# Patient Record
Sex: MALE | Race: OTHER | HISPANIC OR LATINO | URBAN - METROPOLITAN AREA
[De-identification: names, ages, dates, MRNs, and addresses within clinical notes are randomized per-mention and may not be internally consistent; named-entity substitution may affect disease eponyms.]

---

## 2021-06-28 ENCOUNTER — INPATIENT (INPATIENT)
Facility: HOSPITAL | Age: 46
LOS: 2 days | Discharge: HOME | End: 2021-07-01
Attending: STUDENT IN AN ORGANIZED HEALTH CARE EDUCATION/TRAINING PROGRAM | Admitting: STUDENT IN AN ORGANIZED HEALTH CARE EDUCATION/TRAINING PROGRAM
Payer: COMMERCIAL

## 2021-06-28 VITALS
HEART RATE: 104 BPM | RESPIRATION RATE: 18 BRPM | SYSTOLIC BLOOD PRESSURE: 154 MMHG | DIASTOLIC BLOOD PRESSURE: 90 MMHG | OXYGEN SATURATION: 98 % | TEMPERATURE: 99 F

## 2021-06-28 LAB
ALBUMIN SERPL ELPH-MCNC: 4.6 G/DL — SIGNIFICANT CHANGE UP (ref 3.3–5)
ALP SERPL-CCNC: 99 U/L — SIGNIFICANT CHANGE UP (ref 40–120)
ALT FLD-CCNC: 22 U/L — SIGNIFICANT CHANGE UP (ref 4–41)
ANION GAP SERPL CALC-SCNC: 14 MMOL/L — SIGNIFICANT CHANGE UP (ref 7–14)
APTT BLD: 28.9 SEC — SIGNIFICANT CHANGE UP (ref 27–36.3)
AST SERPL-CCNC: 19 U/L — SIGNIFICANT CHANGE UP (ref 4–40)
BASOPHILS # BLD AUTO: 0.06 K/UL — SIGNIFICANT CHANGE UP (ref 0–0.2)
BASOPHILS NFR BLD AUTO: 0.5 % — SIGNIFICANT CHANGE UP (ref 0–2)
BILIRUB SERPL-MCNC: 0.6 MG/DL — SIGNIFICANT CHANGE UP (ref 0.2–1.2)
BUN SERPL-MCNC: 12 MG/DL — SIGNIFICANT CHANGE UP (ref 7–23)
CALCIUM SERPL-MCNC: 9.8 MG/DL — SIGNIFICANT CHANGE UP (ref 8.4–10.5)
CHLORIDE SERPL-SCNC: 101 MMOL/L — SIGNIFICANT CHANGE UP (ref 98–107)
CO2 SERPL-SCNC: 24 MMOL/L — SIGNIFICANT CHANGE UP (ref 22–31)
CREAT SERPL-MCNC: 0.87 MG/DL — SIGNIFICANT CHANGE UP (ref 0.5–1.3)
EOSINOPHIL # BLD AUTO: 0.15 K/UL — SIGNIFICANT CHANGE UP (ref 0–0.5)
EOSINOPHIL NFR BLD AUTO: 1.3 % — SIGNIFICANT CHANGE UP (ref 0–6)
GLUCOSE SERPL-MCNC: 102 MG/DL — HIGH (ref 70–99)
HCT VFR BLD CALC: 44.2 % — SIGNIFICANT CHANGE UP (ref 39–50)
HGB BLD-MCNC: 14 G/DL — SIGNIFICANT CHANGE UP (ref 13–17)
IANC: 8.28 K/UL — SIGNIFICANT CHANGE UP (ref 1.5–8.5)
IMM GRANULOCYTES NFR BLD AUTO: 0.3 % — SIGNIFICANT CHANGE UP (ref 0–1.5)
LYMPHOCYTES # BLD AUTO: 19.7 % — SIGNIFICANT CHANGE UP (ref 13–44)
LYMPHOCYTES # BLD AUTO: 2.31 K/UL — SIGNIFICANT CHANGE UP (ref 1–3.3)
MCHC RBC-ENTMCNC: 26.7 PG — LOW (ref 27–34)
MCHC RBC-ENTMCNC: 31.7 GM/DL — LOW (ref 32–36)
MCV RBC AUTO: 84.4 FL — SIGNIFICANT CHANGE UP (ref 80–100)
MONOCYTES # BLD AUTO: 0.9 K/UL — SIGNIFICANT CHANGE UP (ref 0–0.9)
MONOCYTES NFR BLD AUTO: 7.7 % — SIGNIFICANT CHANGE UP (ref 2–14)
NEUTROPHILS # BLD AUTO: 8.28 K/UL — HIGH (ref 1.8–7.4)
NEUTROPHILS NFR BLD AUTO: 70.5 % — SIGNIFICANT CHANGE UP (ref 43–77)
NRBC # BLD: 0 /100 WBCS — SIGNIFICANT CHANGE UP
NRBC # FLD: 0 K/UL — SIGNIFICANT CHANGE UP
PLATELET # BLD AUTO: 295 K/UL — SIGNIFICANT CHANGE UP (ref 150–400)
POTASSIUM SERPL-MCNC: 4.7 MMOL/L — SIGNIFICANT CHANGE UP (ref 3.5–5.3)
POTASSIUM SERPL-SCNC: 4.7 MMOL/L — SIGNIFICANT CHANGE UP (ref 3.5–5.3)
PROT SERPL-MCNC: 7.7 G/DL — SIGNIFICANT CHANGE UP (ref 6–8.3)
RBC # BLD: 5.24 M/UL — SIGNIFICANT CHANGE UP (ref 4.2–5.8)
RBC # FLD: 13.6 % — SIGNIFICANT CHANGE UP (ref 10.3–14.5)
SODIUM SERPL-SCNC: 139 MMOL/L — SIGNIFICANT CHANGE UP (ref 135–145)
WBC # BLD: 11.73 K/UL — HIGH (ref 3.8–10.5)
WBC # FLD AUTO: 11.73 K/UL — HIGH (ref 3.8–10.5)

## 2021-06-28 PROCEDURE — 73140 X-RAY EXAM OF FINGER(S): CPT | Mod: 26,LT,59

## 2021-06-28 PROCEDURE — 73120 X-RAY EXAM OF HAND: CPT | Mod: 26,LT

## 2021-06-28 PROCEDURE — 99218: CPT | Mod: 25

## 2021-06-28 PROCEDURE — 73130 X-RAY EXAM OF HAND: CPT | Mod: 26,LT

## 2021-06-28 RX ORDER — AMLODIPINE BESYLATE 2.5 MG/1
5 TABLET ORAL DAILY
Refills: 0 | Status: DISCONTINUED | OUTPATIENT
Start: 2021-06-28 | End: 2021-06-30

## 2021-06-28 RX ORDER — SODIUM CHLORIDE 9 MG/ML
1500 INJECTION INTRAMUSCULAR; INTRAVENOUS; SUBCUTANEOUS ONCE
Refills: 0 | Status: COMPLETED | OUTPATIENT
Start: 2021-06-28 | End: 2021-06-28

## 2021-06-28 RX ADMIN — SODIUM CHLORIDE 1500 MILLILITER(S): 9 INJECTION INTRAMUSCULAR; INTRAVENOUS; SUBCUTANEOUS at 23:15

## 2021-06-28 RX ADMIN — Medication 100 MILLIGRAM(S): at 21:57

## 2021-06-28 RX ADMIN — SODIUM CHLORIDE 1500 MILLILITER(S): 9 INJECTION INTRAMUSCULAR; INTRAVENOUS; SUBCUTANEOUS at 21:57

## 2021-06-28 RX ADMIN — Medication 600 MILLIGRAM(S): at 23:15

## 2021-06-28 NOTE — ED ADULT TRIAGE NOTE - CHIEF COMPLAINT QUOTE
pt arrives sent from Urgent Care to r/o cellulitis. Since Saturday pt has had swelling, redness to L hand, worsening today. No fevers/chills, CP, SOB. PMHx HTN

## 2021-06-28 NOTE — ED CDU PROVIDER INITIAL DAY NOTE - ATTENDING CONTRIBUTION TO CARE
44 y/o M w/ PMH HTN presents to the ED w/ swelling and pain to the back of his L hand. Pt is R handed at baseline. Pt states he has a small pimple on his L 3rd finger yesterday and squeezed it to try and pop it. Pt further states he went to work today wearing gloves and he felt pain to his hand, when he removed his glove he noticed swelling which prompted him to go to urgent care who outlined his swelling w/ a skin maker and referred him to the ED. Denies fever and has full ROM. Placed in CDU for IV abx and reassessment in the am.

## 2021-06-28 NOTE — ED PROVIDER NOTE - NS_ ATTENDINGSCRIBEDETAILS _ED_A_ED_FT
Elma Knapp MD: The history, relevant review of systems, past medical and surgical history, medical decision making, and physical examination was documented by the scribe in my presence and I attest to the accuracy of the documentation.

## 2021-06-28 NOTE — ED PROVIDER NOTE - OBJECTIVE STATEMENT
46 y/o M w/ PMH HTN presents to the ED w/ swelling and pain to the back of his L hand. Pt is R handed at baseline. Pt states he has a small pimple on his L 3rd finger yesterday and squeezed it to try and pop it. Pt further states he went to work today wearing gloves and he felt pain to his hand, when he removed his glove he noticed swelling which prompted him to go to urgent care who outlined his swelling w/ a skin maker and referred him to the ED. Denies fever and has full ROM.

## 2021-06-28 NOTE — ED CDU PROVIDER INITIAL DAY NOTE - OBJECTIVE STATEMENT
46 y/o M w/ PMH HTN presents to the ED w/ swelling and pain to the back of his L hand. Pt is R handed at baseline. Pt states he has a small pimple on his L 3rd finger yesterday and squeezed it to try and pop it. Pt further states he went to work today wearing gloves and he felt pain to his hand, when he removed his glove he noticed swelling which prompted him to go to urgent care who outlined his swelling w/ a skin maker and referred him to the ED. Denies fever and has full ROM.  While in ED received 1 dose Clindamycin. Hand XR shows 3rd digit cellulitis. Labs unremarkable. Cellulitis demarcated. Plan to observe in CDU overnight with continued IV abx. Reassess in AM.

## 2021-06-28 NOTE — ED PROVIDER NOTE - UPPER EXTREMITY EXAM, RIGHT
Swelling to dorsum hand extends to 3rd finger, raised pustular area, no fluctuance, no crepitus, 2+ distal pulses to radial wrist, good cap refill, full ROM.

## 2021-06-28 NOTE — ED PROVIDER NOTE - CLINICAL SUMMARY MEDICAL DECISION MAKING FREE TEXT BOX
46 y/o M p/w L hand cellulitis. Will obtain X-ray to rule out gas/air, give IV, antibiotics and observe in CDU.

## 2021-06-28 NOTE — ED ADULT NURSE NOTE - OBJECTIVE STATEMENT
Pt received to intake room 5. Pt A&Ox4, ambulatory. Pt states that he has a bump on his left hand yesterday and today he began to noticed increasing swelling and redness around the area. Pt c/o of slight pain to the area as well. Respirations equal and unlabored, no acute distress noted. 20g IV placed in right AC, labs drawn and sent as ordered. Vital signs as noted, comfort measures provided, call bell within reach. Assessment ongoing.

## 2021-06-29 DIAGNOSIS — Z29.9 ENCOUNTER FOR PROPHYLACTIC MEASURES, UNSPECIFIED: ICD-10-CM

## 2021-06-29 DIAGNOSIS — L02.91 CUTANEOUS ABSCESS, UNSPECIFIED: ICD-10-CM

## 2021-06-29 DIAGNOSIS — M54.9 DORSALGIA, UNSPECIFIED: ICD-10-CM

## 2021-06-29 DIAGNOSIS — L03.012 CELLULITIS OF LEFT FINGER: ICD-10-CM

## 2021-06-29 DIAGNOSIS — L03.90 CELLULITIS, UNSPECIFIED: ICD-10-CM

## 2021-06-29 DIAGNOSIS — I10 ESSENTIAL (PRIMARY) HYPERTENSION: ICD-10-CM

## 2021-06-29 LAB — SARS-COV-2 RNA SPEC QL NAA+PROBE: SIGNIFICANT CHANGE UP

## 2021-06-29 PROCEDURE — 99217: CPT

## 2021-06-29 PROCEDURE — 99223 1ST HOSP IP/OBS HIGH 75: CPT | Mod: GC

## 2021-06-29 RX ORDER — VANCOMYCIN HCL 1 G
1000 VIAL (EA) INTRAVENOUS
Refills: 0 | Status: DISCONTINUED | OUTPATIENT
Start: 2021-06-29 | End: 2021-06-29

## 2021-06-29 RX ORDER — VANCOMYCIN HCL 1 G
1500 VIAL (EA) INTRAVENOUS EVERY 12 HOURS
Refills: 0 | Status: DISCONTINUED | OUTPATIENT
Start: 2021-06-30 | End: 2021-07-01

## 2021-06-29 RX ORDER — IBUPROFEN 200 MG
600 TABLET ORAL ONCE
Refills: 0 | Status: COMPLETED | OUTPATIENT
Start: 2021-06-29 | End: 2021-06-29

## 2021-06-29 RX ORDER — VANCOMYCIN HCL 1 G
1000 VIAL (EA) INTRAVENOUS ONCE
Refills: 0 | Status: COMPLETED | OUTPATIENT
Start: 2021-06-29 | End: 2021-06-29

## 2021-06-29 RX ORDER — TETANUS TOXOID, REDUCED DIPHTHERIA TOXOID AND ACELLULAR PERTUSSIS VACCINE, ADSORBED 5; 2.5; 8; 8; 2.5 [IU]/.5ML; [IU]/.5ML; UG/.5ML; UG/.5ML; UG/.5ML
0.5 SUSPENSION INTRAMUSCULAR ONCE
Refills: 0 | Status: COMPLETED | OUTPATIENT
Start: 2021-06-29 | End: 2021-06-29

## 2021-06-29 RX ORDER — ACETAMINOPHEN 500 MG
650 TABLET ORAL EVERY 6 HOURS
Refills: 0 | Status: DISCONTINUED | OUTPATIENT
Start: 2021-06-29 | End: 2021-07-01

## 2021-06-29 RX ADMIN — AMLODIPINE BESYLATE 5 MILLIGRAM(S): 2.5 TABLET ORAL at 06:11

## 2021-06-29 RX ADMIN — Medication 250 MILLIGRAM(S): at 12:45

## 2021-06-29 RX ADMIN — Medication 600 MILLIGRAM(S): at 17:58

## 2021-06-29 RX ADMIN — Medication 300 MILLIGRAM(S): at 23:44

## 2021-06-29 RX ADMIN — Medication 100 MILLIGRAM(S): at 06:12

## 2021-06-29 RX ADMIN — Medication 650 MILLIGRAM(S): at 20:41

## 2021-06-29 RX ADMIN — Medication 650 MILLIGRAM(S): at 21:11

## 2021-06-29 NOTE — ED CDU PROVIDER SUBSEQUENT DAY NOTE - PHYSICAL EXAMINATION
Left hand: NV intact, erythema and swelling to posterior aspect of hand, warm to touch, redness extending past marked border, small pustule noted to posterior aspect of 3rd digit proximal phalanx, FROM, moving all fingers.

## 2021-06-29 NOTE — ED CDU PROVIDER DISPOSITION NOTE - ATTENDING CONTRIBUTION TO CARE
46 y/o M w/ PMH HTN presents to the ED w/ swelling and pain to the back of his L hand. Pt is R handed at baseline. Pt states he has a small pimple on his L 3rd finger yesterday and squeezed it to try and pop it. Pt further states he went to work wearing gloves and he felt pain to his hand, when he removed his glove he noticed swelling which prompted him to go to urgent care who outlined his swelling w/ a skin maker and referred him to the ED. Denies fever and has full ROM.  While in ED received 1 dose Clindamycin.  Pt ws observed overnight in CDU, but had worsening erythema and edema of hand.  Hand consult was called and I&D was performed and patient was recommended for admission for continued abx.  Pt resting comfortably after I&D and understands plan for admission.  - Rachel Wallace DO

## 2021-06-29 NOTE — H&P ADULT - NSHPLABSRESULTS_GEN_ALL_CORE
Personally reviewed labs, imaging, ekg                           14.0   11.73 )-----------( 295      ( 28 Jun 2021 22:25 )             44.2       06-28    139  |  101  |  12  ----------------------------<  102<H>  4.7   |  24  |  0.87    Ca    9.8      28 Jun 2021 22:25    TPro  7.7  /  Alb  4.6  /  TBili  0.6  /  DBili  x   /  AST  19  /  ALT  22  /  AlkPhos  99  06-28                  PTT - ( 28 Jun 2021 22:25 )  PTT:28.9 sec    Lactate Trend            CAPILLARY BLOOD GLUCOSE    < from: Xray Hand 3 Views, Left (06.28.21 @ 21:28) >    IMPRESSION:  1.  Left third digit cellulitis.  2.  No tracking subcutaneous emphysema.  3.  No radiographic evidence of advanced osteomyelitis.      < end of copied text >                    EKG:

## 2021-06-29 NOTE — H&P ADULT - NSICDXPASTMEDICALHX_GEN_ALL_CORE_FT
PAST MEDICAL HISTORY:  Back pain sciatica    Hypertension     No pertinent past medical history

## 2021-06-29 NOTE — ED CDU PROVIDER SUBSEQUENT DAY NOTE - PROGRESS NOTE DETAILS
Pt well appearing. Continue IV abx. Erythema contained within demarcated area. Pt seen and examined bedside - dorsal left 3rd digit with pustule, edema and increasing erythema despite clinda.  Pt reports pain in the area and worsening swelling.  No fevers or chills.  will consult hand as I&D seems necessary at this time given worsening appearance.  - Rachel Wallace,

## 2021-06-29 NOTE — ED CDU PROVIDER SUBSEQUENT DAY NOTE - ATTENDING CONTRIBUTION TO CARE
Pt seen and examined bedside - dorsal left 3rd digit with pustule, edema and increasing erythema despite clinda.  Pt reports pain in the area and worsening swelling.  No fevers or chills.  will consult hand as I&D seems necessary at this time given worsening appearance.  - Rachel Wallace DO.

## 2021-06-29 NOTE — H&P ADULT - NSICDXFAMILYHX_GEN_ALL_CORE_FT
FAMILY HISTORY:  Father  Still living? Unknown  FH: HTN (hypertension), Age at diagnosis: Age Unknown  FH: type 2 diabetes, Age at diagnosis: Age Unknown

## 2021-06-29 NOTE — H&P ADULT - HISTORY OF PRESENT ILLNESS
44 y/o M w/ PMH of HTN and chronic back pain presenting with small pimple-like abscess on 3rd finger of L hand and swelling of dorsum portion of hand. Pt reports the abscess first formed on Saturday and progressively got more painful and the swelling spread until reaching approximately 3/4 of the dorsum of his hand today. He reports the pain as 10/10, constant, radiating from his finger to the rest of his hand. He did not try anything to alleviate the pain, movement makes the pain worse. Pt denies being bitten or scraping his hand, reports that the abscess formed out of nowhere. This has never happened before. Denies numbness, paresthesia, muscle weakness, fever, chills, SOB, CP, cough, nausea, vomiting, diarrhea, constipation.    PMH: HTN, chronic back pain of six months   46 y/o M w/ PMH of HTN and chronic back pain presenting with small pimple-like abscess on 3rd finger of L hand and swelling of dorsum portion of hand. Pt reports the abscess first formed on Saturday and progressively got more painful and the swelling spread until reaching approximately 3/4 of the dorsum of his hand today. He reports the pain as 10/10, constant, radiating from his finger to the rest of his hand. He did not try anything to alleviate the pain, movement makes the pain worse. Pt denies being bitten or scraping his hand, reports that the abscess formed out of nowhere. This has never happened before. Denies numbness, paresthesia, muscle weakness, fever, chills, SOB, CP, cough, nausea, vomiting, diarrhea, constipation.    PMH: HTN, chronic back pain of six months    ED course: Received Azithromycin 600 mg q8 , Vancomycin 1g, 1.5L NS, motrin  46 y/o M w/ PMH of HTN and chronic back pain presenting with small pimple-like abscess on 3rd finger of L hand and swelling of dorsum portion of hand. Pt reports the abscess first formed on Saturday and progressively got more painful and the swelling spread until reaching approximately 3/4 of the dorsum of his hand today. He reports the pain as 10/10, constant, radiating from his finger to the rest of his hand. He did not try anything to alleviate the pain, movement makes the pain worse. Pt denies being bitten or scraping his hand, reports that the abscess formed out of nowhere. This has never happened before. Denies numbness, paresthesia, muscle weakness, fever, chills, SOB, CP, cough, nausea, vomiting, diarrhea, constipation.    PMH: HTN, chronic back pain of six months    ED course: Received clindamycin 600 mg q8 , Vancomycin 1g, 1.5L NS, motrin

## 2021-06-29 NOTE — H&P ADULT - NSHPPHYSICALEXAM_GEN_ALL_CORE
VITALS:   T(C): 36.9 (06-29-21 @ 17:35), Max: 37.1 (06-29-21 @ 06:07)  HR: 82 (06-29-21 @ 17:35) (68 - 82)  BP: 146/75 (06-29-21 @ 17:35) (130/84 - 150/87)  RR: 18 (06-29-21 @ 17:35) (17 - 20)  SpO2: 99% (06-29-21 @ 17:35) (98% - 100%)    GENERAL: NAD, lying in bed comfortably  HEAD:  Atraumatic, Normocephalic  EYES: EOMI, PERRLA, conjunctiva and sclera clear  ENT: Moist mucous membranes  NECK: Supple, No JVD  CHEST/LUNG: Clear to auscultation bilaterally; No rales, rhonchi, wheezing, or rubs. Unlabored respirations  HEART: Regular rate and rhythm; No murmurs, rubs, or gallops  ABDOMEN: BSx4; Soft, nontender, nondistended  EXTREMITIES:   3rd finger on L hand s/p I&D, wrapped in bandage, L dorsum of hand significantly swollen and tender 3/4 down to the wrist.  NERVOUS SYSTEM:  A&Ox3, no focal deficits   SKIN: Erythema around site of abscess on 3rd L finger; No rashes or lesions  psych: normal behavior, normal affect

## 2021-06-29 NOTE — H&P ADULT - ASSESSMENT
44 y/o M with PMH of HTN and sciatica p/w abscess on 3rd finger of L hand and swelling on dorsum of L hand. WBC 11.73, X-ray of L hand showed cellulitis of 3rd finger, will start on vancomycin 1 gram and perform tdap shot.

## 2021-06-29 NOTE — H&P ADULT - NSHPREVIEWOFSYSTEMS_GEN_ALL_CORE
REVIEW OF SYSTEMS:    CONSTITUTIONAL: No weakness, fevers or chills  EYES: No visual changes; no sclera icterics, no pain or drainage  ENT:  No vertigo or throat pain   NECK: No pain or stiffness  RESPIRATORY: No cough, wheezing, hemoptysis; No shortness of breath  CARDIOVASCULAR: No chest pain or palpitations  GASTROINTESTINAL: No abdominal or epigastric pain. No nausea, vomiting, or hematemesis; No diarrhea or constipation. No melena or hematochezia.  GENITOURINARY: No dysuria, frequency or hematuria  NEUROLOGICAL: No numbness or weakness  SKIN: No itching, rashes  Psych: No anxiety or depression

## 2021-06-29 NOTE — H&P ADULT - ATTENDING COMMENTS
Patient is a 45 year old man with past medical history of HTN coming in for L hand cellulitis + abscess. S/p abscess drainage. Patient pending cultures for de-escalation (patient currently on vancomycin for empiric coverage of MRSA). Anticipate discharge 7/1.    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 Will give tetanus shot.   - anticipate d'c 7/1 plan communicated with patient

## 2021-06-29 NOTE — H&P ADULT - NSHPSOCIALHISTORY_GEN_ALL_CORE
Lives in a house in [place], has a dog at home, social drinker, denies smoking and drug use. Sexually active with wife, does not use condoms. Lives in a house in [place], has a dog at home, social drinker, denies smoking and drug use. Sexually active with wife, does not use condoms. Works in construction as a fermin.

## 2021-06-29 NOTE — ED CDU PROVIDER DISPOSITION NOTE - CLINICAL COURSE
46 y/o M w/ PMH HTN presents to the ED w/ swelling and pain to the back of his L hand. Pt is R handed at baseline. Pt states he has a small pimple on his L 3rd finger yesterday and squeezed it to try and pop it. Pt further states he went to work wearing gloves and he felt pain to his hand, when he removed his glove he noticed swelling which prompted him to go to urgent care who outlined his swelling w/ a skin maker and referred him to the ED. Denies fever and has full ROM.  While in ED received 1 dose Clindamycin.  Pt ws observed overnight in CDU, but had worsening erythema and edema of hand.  Hand consult was called and I&D was performed and patient was recommended for admission for continued abx.

## 2021-06-29 NOTE — ED CDU PROVIDER SUBSEQUENT DAY NOTE - HISTORY
AN Gutierres: pt seen and evaluated by Hand Surgery Dr Cheng I&D preformed at bedside advised to admit for further management.  Pt with left hand cellulitis extending passed marked border.  Pt aware and agreeable with plan.  MAR notified.

## 2021-06-30 LAB
BASOPHILS # BLD AUTO: 0.05 K/UL — SIGNIFICANT CHANGE UP (ref 0–0.2)
BASOPHILS NFR BLD AUTO: 0.6 % — SIGNIFICANT CHANGE UP (ref 0–2)
COVID-19 SPIKE DOMAIN AB INTERP: POSITIVE
COVID-19 SPIKE DOMAIN ANTIBODY RESULT: >250 U/ML — HIGH
EOSINOPHIL # BLD AUTO: 0.16 K/UL — SIGNIFICANT CHANGE UP (ref 0–0.5)
EOSINOPHIL NFR BLD AUTO: 1.9 % — SIGNIFICANT CHANGE UP (ref 0–6)
HCT VFR BLD CALC: 43.8 % — SIGNIFICANT CHANGE UP (ref 39–50)
HGB BLD-MCNC: 14.1 G/DL — SIGNIFICANT CHANGE UP (ref 13–17)
IANC: 6.36 K/UL — SIGNIFICANT CHANGE UP (ref 1.5–8.5)
IMM GRANULOCYTES NFR BLD AUTO: 0.2 % — SIGNIFICANT CHANGE UP (ref 0–1.5)
LYMPHOCYTES # BLD AUTO: 1.28 K/UL — SIGNIFICANT CHANGE UP (ref 1–3.3)
LYMPHOCYTES # BLD AUTO: 15.1 % — SIGNIFICANT CHANGE UP (ref 13–44)
MCHC RBC-ENTMCNC: 27 PG — SIGNIFICANT CHANGE UP (ref 27–34)
MCHC RBC-ENTMCNC: 32.2 GM/DL — SIGNIFICANT CHANGE UP (ref 32–36)
MCV RBC AUTO: 83.7 FL — SIGNIFICANT CHANGE UP (ref 80–100)
MONOCYTES # BLD AUTO: 0.62 K/UL — SIGNIFICANT CHANGE UP (ref 0–0.9)
MONOCYTES NFR BLD AUTO: 7.3 % — SIGNIFICANT CHANGE UP (ref 2–14)
NEUTROPHILS # BLD AUTO: 6.36 K/UL — SIGNIFICANT CHANGE UP (ref 1.8–7.4)
NEUTROPHILS NFR BLD AUTO: 74.9 % — SIGNIFICANT CHANGE UP (ref 43–77)
NRBC # BLD: 0 /100 WBCS — SIGNIFICANT CHANGE UP
NRBC # FLD: 0 K/UL — SIGNIFICANT CHANGE UP
PLATELET # BLD AUTO: 286 K/UL — SIGNIFICANT CHANGE UP (ref 150–400)
RBC # BLD: 5.23 M/UL — SIGNIFICANT CHANGE UP (ref 4.2–5.8)
RBC # FLD: 13.6 % — SIGNIFICANT CHANGE UP (ref 10.3–14.5)
SARS-COV-2 IGG+IGM SERPL QL IA: >250 U/ML — HIGH
SARS-COV-2 IGG+IGM SERPL QL IA: POSITIVE
WBC # BLD: 8.49 K/UL — SIGNIFICANT CHANGE UP (ref 3.8–10.5)
WBC # FLD AUTO: 8.49 K/UL — SIGNIFICANT CHANGE UP (ref 3.8–10.5)

## 2021-06-30 PROCEDURE — 99232 SBSQ HOSP IP/OBS MODERATE 35: CPT | Mod: GC

## 2021-06-30 RX ORDER — LACTOBACILLUS ACIDOPHILUS 100MM CELL
1 CAPSULE ORAL DAILY
Refills: 0 | Status: DISCONTINUED | OUTPATIENT
Start: 2021-06-30 | End: 2021-07-01

## 2021-06-30 RX ORDER — HYDROGEN PEROXIDE 0.3 KG/100L
1 LIQUID TOPICAL ONCE
Refills: 0 | Status: COMPLETED | OUTPATIENT
Start: 2021-06-30 | End: 2021-07-01

## 2021-06-30 RX ORDER — AMLODIPINE BESYLATE 2.5 MG/1
5 TABLET ORAL ONCE
Refills: 0 | Status: COMPLETED | OUTPATIENT
Start: 2021-06-30 | End: 2021-06-30

## 2021-06-30 RX ORDER — AMLODIPINE BESYLATE 2.5 MG/1
10 TABLET ORAL DAILY
Refills: 0 | Status: DISCONTINUED | OUTPATIENT
Start: 2021-07-01 | End: 2021-07-01

## 2021-06-30 RX ORDER — HYDROGEN PEROXIDE 0.3 KG/100L
1 LIQUID TOPICAL ONCE
Refills: 0 | Status: DISCONTINUED | OUTPATIENT
Start: 2021-06-30 | End: 2021-06-30

## 2021-06-30 RX ADMIN — Medication 300 MILLIGRAM(S): at 23:24

## 2021-06-30 RX ADMIN — AMLODIPINE BESYLATE 5 MILLIGRAM(S): 2.5 TABLET ORAL at 06:35

## 2021-06-30 RX ADMIN — AMLODIPINE BESYLATE 5 MILLIGRAM(S): 2.5 TABLET ORAL at 12:59

## 2021-06-30 RX ADMIN — Medication 650 MILLIGRAM(S): at 23:00

## 2021-06-30 RX ADMIN — Medication 650 MILLIGRAM(S): at 22:27

## 2021-06-30 RX ADMIN — Medication 300 MILLIGRAM(S): at 12:55

## 2021-06-30 NOTE — PROGRESS NOTE ADULT - ATTENDING COMMENTS
Patient is a 45 year old man with past medical history of HTN coming in for L hand cellulitis + abscess. S/p abscess drainage. Patient pending cultures for de-escalation (patient currently on vancomycin for empiric coverage of MRSA). Anticipate discharge 7/1-7/2 pending plastics clearance and culture data.  - continue with vancomycin for left hand cellulitis and abscess  - monitor for improvement of swelling and erythema  - uptitrated amlodipine for better BP control   - f/u cultures for de-escalation   - anticipate d'c 7/1-7/2 plan communicated with patient .

## 2021-06-30 NOTE — PROGRESS NOTE ADULT - SUBJECTIVE AND OBJECTIVE BOX
HALLEY FARRIS  45y  Male      Patient is a 45y old  Male who presents with a chief complaint of cellulitis of 3rd finger of L hand (29 Jun 2021 18:02)      INTERVAL HPI/OVERNIGHT EVENTS:      REVIEW OF SYSTEMS:  CONSTITUTIONAL: No fever, weight loss, or fatigue  EYES: No eye pain, visual disturbances, or discharge  ENMT:  No difficulty hearing, tinnitus, vertigo; No sinus or throat pain  NECK: No pain or stiffness  BREASTS: No pain, masses, or nipple discharge  RESPIRATORY: No cough, wheezing, chills or hemoptysis; No shortness of breath  CARDIOVASCULAR: No chest pain, palpitations, dizziness, or leg swelling  GASTROINTESTINAL: No abdominal or epigastric pain. No nausea, vomiting, or hematemesis; No diarrhea or constipation. No melena or hematochezia.  GENITOURINARY: No dysuria, frequency, hematuria, or incontinence  NEUROLOGICAL: No headaches, memory loss, loss of strength, numbness, or tremors  SKIN: No itching, burning, rashes, or lesions   LYMPH NODES: No enlarged glands  ENDOCRINE: No heat or cold intolerance; No hair loss  MUSCULOSKELETAL: No joint pain or swelling; No muscle, back, or extremity pain  PSYCHIATRIC: No depression, anxiety, mood swings, or difficulty sleeping  HEME/LYMPH: No easy bruising, or bleeding gums  ALLERY AND IMMUNOLOGIC: No hives or eczema  FAMILY HISTORY:  FH: type 2 diabetes (Father)  htn    FH: HTN (hypertension) (Father)      T(C): 36.9 (06-30-21 @ 06:30), Max: 36.9 (06-29-21 @ 09:40)  HR: 69 (06-30-21 @ 06:30) (68 - 82)  BP: 150/92 (06-30-21 @ 06:30) (143/75 - 151/75)  RR: 18 (06-30-21 @ 06:30) (17 - 18)  SpO2: 98% (06-30-21 @ 06:30) (98% - 99%)  Wt(kg): --Vital Signs Last 24 Hrs  T(C): 36.9 (30 Jun 2021 06:30), Max: 36.9 (29 Jun 2021 09:40)  T(F): 98.5 (30 Jun 2021 06:30), Max: 98.5 (29 Jun 2021 09:40)  HR: 69 (30 Jun 2021 06:30) (68 - 82)  BP: 150/92 (30 Jun 2021 06:30) (143/75 - 151/75)  BP(mean): --  RR: 18 (30 Jun 2021 06:30) (17 - 18)  SpO2: 98% (30 Jun 2021 06:30) (98% - 99%)  No Known Allergies      PHYSICAL EXAM:  GENERAL: NAD, well-groomed, well-developed  HEAD:  Atraumatic, Normocephalic  EYES: EOMI, PERRLA, conjunctiva and sclera clear  ENMT: No tonsillar erythema, exudates, or enlargement; Moist mucous membranes, Good dentition, No lesions  NECK: Supple, No JVD, Normal thyroid  NERVOUS SYSTEM:  Alert & Oriented X3, Good concentration; Motor Strength 5/5 B/L upper and lower extremities; DTRs 2+ intact and symmetric  CHEST/LUNG: Clear to percussion bilaterally; No rales, rhonchi, wheezing, or rubs  HEART: Regular rate and rhythm; No murmurs, rubs, or gallops  ABDOMEN: Soft, Nontender, Nondistended; Bowel sounds present  EXTREMITIES:  2+ Peripheral Pulses, No clubbing, cyanosis, or edema  LYMPH: No lymphadenopathy noted  SKIN: No rashes or lesions    Consultant(s) Notes Reviewed:  [x ] YES  [ ] NO  Care Discussed with Consultants/Other Providers [ x] YES  [ ] NO    LABS:      RADIOLOGY & ADDITIONAL TESTS:    Imaging Personally Reviewed:  [ ] YES  [ ] NO  acetaminophen   Tablet .. 650 milliGRAM(s) Oral every 6 hours PRN  amLODIPine   Tablet 5 milliGRAM(s) Oral daily  vancomycin  IVPB 1500 milliGRAM(s) IV Intermittent every 12 hours      HEALTH ISSUES - PROBLEM Dx:  Abscess  Abscess    Cellulitis of finger of left hand  Cellulitis of finger of left hand    Back pain  Back pain    Hypertension  Hypertension    Prophylactic measure  Prophylactic measure             HALLEY FARRIS  45y  Male      Patient is a 45y old  Male who presents with a chief complaint of cellulitis of 3rd finger of L hand (29 Jun 2021 18:02)      INTERVAL HPI/OVERNIGHT EVENTS: No acute events overnight. Pt seen at bedside this morning, reported improvement in dorsum of hand in terms of pain, erythema and swelling but new erythema and pain under 3rd finger on L hand. Denied fever, chills, SOB, CP, n/v/c/d.      REVIEW OF SYSTEMS:  CONSTITUTIONAL: No fever, weight loss, or fatigue  EYES: No eye pain, visual disturbances, or discharge  ENMT:  No difficulty hearing, tinnitus, vertigo; No sinus or throat pain  NECK: No pain or stiffness  BREASTS: No pain, masses, or nipple discharge  RESPIRATORY: No cough, wheezing, chills or hemoptysis; No shortness of breath  CARDIOVASCULAR: No chest pain, palpitations, dizziness, or leg swelling  GASTROINTESTINAL: No abdominal or epigastric pain. No nausea, vomiting, or hematemesis; No diarrhea or constipation. No melena or hematochezia.  GENITOURINARY: No dysuria, frequency, hematuria, or incontinence  NEUROLOGICAL: No headaches, memory loss, loss of strength, numbness, or tremors  SKIN: No itching, burning, rashes, or lesions   LYMPH NODES: No enlarged glands  ENDOCRINE: No heat or cold intolerance; No hair loss  MUSCULOSKELETAL: No joint pain or swelling; No muscle, back, or extremity pain  PSYCHIATRIC: No depression, anxiety, mood swings, or difficulty sleeping  HEME/LYMPH: No easy bruising, or bleeding gums  ALLERY AND IMMUNOLOGIC: No hives or eczema  FAMILY HISTORY:  FH: type 2 diabetes (Father)  htn    FH: HTN (hypertension) (Father)      T(C): 36.9 (06-30-21 @ 06:30), Max: 36.9 (06-29-21 @ 09:40)  HR: 69 (06-30-21 @ 06:30) (68 - 82)  BP: 150/92 (06-30-21 @ 06:30) (143/75 - 151/75)  RR: 18 (06-30-21 @ 06:30) (17 - 18)  SpO2: 98% (06-30-21 @ 06:30) (98% - 99%)  Wt(kg): --Vital Signs Last 24 Hrs  T(C): 36.9 (30 Jun 2021 06:30), Max: 36.9 (29 Jun 2021 09:40)  T(F): 98.5 (30 Jun 2021 06:30), Max: 98.5 (29 Jun 2021 09:40)  HR: 69 (30 Jun 2021 06:30) (68 - 82)  BP: 150/92 (30 Jun 2021 06:30) (143/75 - 151/75)  BP(mean): --  RR: 18 (30 Jun 2021 06:30) (17 - 18)  SpO2: 98% (30 Jun 2021 06:30) (98% - 99%)  No Known Allergies      GENERAL: NAD, lying in bed comfortably  HEAD:  Atraumatic, Normocephalic  EYES: EOMI, PERRLA, conjunctiva and sclera clear  ENT: Moist mucous membranes  NECK: Supple, No JVD  CHEST/LUNG: Clear to auscultation bilaterally; No rales, rhonchi, wheezing, or rubs. Unlabored respirations  HEART: Regular rate and rhythm; No murmurs, rubs, or gallops  ABDOMEN: BSx4; Soft, nontender, nondistended  EXTREMITIES:   3rd finger on L hand s/p I&D, wrapped in bandage, L dorsum of hand less swollen and erythematous, new erythema and pain below finger on palmar aspect near 3rd MCP joint.  NERVOUS SYSTEM:  A&Ox3, no focal deficits   SKIN: Erythema around site of abscess on 3rd L finger; No rashes or lesions  psych: normal behavior, normal affect          LABS:  cret                        14.1   8.49  )-----------( 286      ( 30 Jun 2021 07:04 )             43.8     06-28    139  |  101  |  12  ----------------------------<  102<H>  4.7   |  24  |  0.87    Ca    9.8      28 Jun 2021 22:25    TPro  7.7  /  Alb  4.6  /  TBili  0.6  /  DBili  x   /  AST  19  /  ALT  22  /  AlkPhos  99  06-28    PTT - ( 28 Jun 2021 22:25 )  PTT:28.9 sec      RADIOLOGY & ADDITIONAL TESTS:    Imaging Personally Reviewed:  [ ] YES  [ ] NO  acetaminophen   Tablet .. 650 milliGRAM(s) Oral every 6 hours PRN  amLODIPine   Tablet 5 milliGRAM(s) Oral daily  vancomycin  IVPB 1500 milliGRAM(s) IV Intermittent every 12 hours      HEALTH ISSUES - PROBLEM Dx:  Abscess  Abscess    Cellulitis of finger of left hand  Cellulitis of finger of left hand    Back pain  Back pain    Hypertension  Hypertension    Prophylactic measure  Prophylactic measure

## 2021-06-30 NOTE — PROGRESS NOTE ADULT - ASSESSMENT
46 y/o M with PMH of HTN and sciatica p/w abscess on 3rd finger of L hand and swelling on dorsum of L hand. WBC 11.73, X-ray of L hand showed cellulitis of 3rd finger, will start on vancomycin 1 gram and perform tdap shot. 44 y/o M with PMH of HTN and sciatica p/w abscess on 3rd finger of L hand and swelling on dorsum of L hand. WBC 11.73, X-ray of L hand showed cellulitis of 3rd finger, receiving 1500 mg vancomycin, s/p I&D in ED. stable

## 2021-06-30 NOTE — PROGRESS NOTE ADULT - PROBLEM SELECTOR PLAN 2
-will start vancomycin 1 gram BID, vanc trough  -tdap booster  -tylenol 650 prn -c/w vanc 1.5 gram BID, vanc trough  -tdap booster done 5/4/18  -tylenol 650 prn  -appreciate plastics recs, ordered basin with saline and 20cc's peroxide

## 2021-06-30 NOTE — PROGRESS NOTE ADULT - PROBLEM SELECTOR PLAN 1
-s/p I&D in ED  -will start vancomycin 1 gram BID, vanc trough  -tdap booster -s/p I&D in ED  -will start vancomycin 1 gram BID, vanc trough  -refused tdap booster, received at [facility]D on May 4 2018

## 2021-07-01 VITALS
HEART RATE: 64 BPM | OXYGEN SATURATION: 100 % | SYSTOLIC BLOOD PRESSURE: 142 MMHG | DIASTOLIC BLOOD PRESSURE: 84 MMHG | RESPIRATION RATE: 18 BRPM | TEMPERATURE: 98 F

## 2021-07-01 LAB
-  AMPICILLIN/SULBACTAM: SIGNIFICANT CHANGE UP
-  CEFAZOLIN: SIGNIFICANT CHANGE UP
-  CLINDAMYCIN: SIGNIFICANT CHANGE UP
-  ERYTHROMYCIN: SIGNIFICANT CHANGE UP
-  GENTAMICIN: SIGNIFICANT CHANGE UP
-  OXACILLIN: SIGNIFICANT CHANGE UP
-  PENICILLIN: SIGNIFICANT CHANGE UP
-  RIFAMPIN: SIGNIFICANT CHANGE UP
-  TETRACYCLINE: SIGNIFICANT CHANGE UP
-  TRIMETHOPRIM/SULFAMETHOXAZOLE: SIGNIFICANT CHANGE UP
-  VANCOMYCIN: SIGNIFICANT CHANGE UP
METHOD TYPE: SIGNIFICANT CHANGE UP

## 2021-07-01 PROCEDURE — 99239 HOSP IP/OBS DSCHRG MGMT >30: CPT | Mod: GC

## 2021-07-01 RX ORDER — AMLODIPINE BESYLATE 2.5 MG/1
1 TABLET ORAL
Qty: 30 | Refills: 0
Start: 2021-07-01 | End: 2021-07-30

## 2021-07-01 RX ADMIN — Medication 1 TABLET(S): at 12:33

## 2021-07-01 RX ADMIN — HYDROGEN PEROXIDE 1 APPLICATION(S): 0.3 LIQUID TOPICAL at 01:38

## 2021-07-01 RX ADMIN — AMLODIPINE BESYLATE 10 MILLIGRAM(S): 2.5 TABLET ORAL at 05:58

## 2021-07-01 NOTE — PROGRESS NOTE ADULT - PROBLEM SELECTOR PLAN 3
-sciatica  -Pt not complaining of pain at this time
-sciatica  -Pt not complaining of pain at this time

## 2021-07-01 NOTE — DISCHARGE NOTE PROVIDER - NSDCMRMEDTOKEN_GEN_ALL_CORE_FT
doxycycline hyclate 100 mg oral tablet: 1 tab(s) orally 2 times a day   Norvasc 10 mg oral tablet: 1 tab(s) orally once a day

## 2021-07-01 NOTE — DISCHARGE NOTE PROVIDER - CARE PROVIDER_API CALL
RICHARD PAZ  Internal Medicine  [address] [place] [address]  Phone: [phone]  Fax: [phone]  Follow Up Time:

## 2021-07-01 NOTE — PROGRESS NOTE ADULT - SUBJECTIVE AND OBJECTIVE BOX
***************************************************************  Theo Campbell, PGY2  Internal Medicine   pager: [phone]  ***************************************************************    HALLEY FARRIS  45y  MRN: [id number]    Patient is a 45y old  Male who presents with a chief complaint of cellulitis of 3rd finger of L hand (30 Jun 2021 07:24)      Subjective: no events ON. Denies fever, CP, SOB, abn pain, N/V, dysuria. Tolerating diet.      MEDICATIONS  (STANDING):  amLODIPine   Tablet 10 milliGRAM(s) Oral daily  lactobacillus acidophilus 1 Tablet(s) Oral daily  vancomycin  IVPB 1500 milliGRAM(s) IV Intermittent every 12 hours    MEDICATIONS  (PRN):  acetaminophen   Tablet .. 650 milliGRAM(s) Oral every 6 hours PRN Mild Pain (1 - 3), Moderate Pain (4 - 6)      Objective:    Vitals: Vital Signs Last 24 Hrs  T(C): 36.6 (07-01-21 @ 05:55), Max: 36.9 (06-30-21 @ 21:05)  T(F): 97.9 (07-01-21 @ 05:55), Max: 98.4 (06-30-21 @ 21:05)  HR: 64 (07-01-21 @ 05:55) (64 - 85)  BP: 142/84 (07-01-21 @ 05:55) (142/84 - 150/78)  BP(mean): --  RR: 18 (07-01-21 @ 05:55) (16 - 18)  SpO2: 100% (07-01-21 @ 05:55) (98% - 100%)            I&O's Summary      PHYSICAL EXAM:  GENERAL: NAD  HEAD:  Atraumatic, Normocephalic  EYES: EOMI, conjunctiva and sclera clear  CHEST/LUNG: Clear to percussion bilaterally; No rales, rhonchi, wheezing, or rubs  HEART: Regular rate and rhythm; No murmurs, rubs, or gallops  ABDOMEN: Soft, Nontender, Nondistended;   SKIN: No rashes or lesions  NERVOUS SYSTEM:  Alert & Oriented X3, no focal deficit    LABS:  06-28    139  |  101  |  12  ----------------------------<  102<H>  4.7   |  24  |  0.87    Ca    9.8      28 Jun 2021 22:25    TPro  7.7  /  Alb  4.6  /  TBili  0.6  /  DBili  x   /  AST  19  /  ALT  22  /  AlkPhos  99  06-28                                              14.1   8.49  )-----------( 286      ( 30 Jun 2021 07:04 )             43.8                         14.0   11.73 )-----------( 295      ( 28 Jun 2021 22:25 )             44.2     CAPILLARY BLOOD GLUCOSE          RADIOLOGY & ADDITIONAL TESTS:    Imaging Personally Reviewed:  [x ] YES  [ ] NO    Consultants involved in case:   Consultant(s) Notes Reviewed:  [ x] YES  [ ] NO:   Care Discussed with Consultants/Other Providers [x ] YES  [ ] NO

## 2021-07-01 NOTE — PROGRESS NOTE ADULT - PROBLEM SELECTOR PLAN 2
-c/w vanc 1.5 gram BID, vanc trough  -tdap booster done 5/4/18  -tylenol 650 prn  -appreciate plastics recs, ordered basin with saline and 20cc's peroxide  -per plastics safe for dispo today -on vanc 1.5 gram BID -> de-escalate to doxycycline for a total 10 day course   -tdap booster done 5/4/18  -tylenol 650 prn  -appreciate plastics recs, ordered basin with saline and 20cc's peroxide  -per plastics safe for dispo today

## 2021-07-01 NOTE — DISCHARGE NOTE NURSING/CASE MANAGEMENT/SOCIAL WORK - PATIENT PORTAL LINK FT
You can access the FollowMyHealth Patient Portal offered by [facility] by registering at the following website: [email or web address]. By joining [employer]’s FollowMyHealth portal, you will also be able to view your health information using other applications (apps) compatible with our system.

## 2021-07-01 NOTE — PROGRESS NOTE ADULT - ATTENDING COMMENTS
Patient is a 45 year old man with past medical history of HTN coming in for L hand cellulitis + abscess. S/p abscess drainage. Cultures growing Staph Aureus sensitive to doxy. For d'c today on 10 day total course of abx.   - d'c on po doxy for a total 10 day course   - uptitrated amlodipine for better BP control  - d'c home today w/ PMD follow up     > 33 minutes spent on discharge planning    [employer] Interpretor: Lania García238

## 2021-07-01 NOTE — PROGRESS NOTE ADULT - PROBLEM SELECTOR PLAN 4
- uptitrate amlodipine to 10mg for better BP control - uptitrated amlodipine to 10mg for better BP control

## 2021-07-01 NOTE — PROGRESS NOTE ADULT - PROBLEM SELECTOR PLAN 1
-s/p I&D in ED  -will start vancomycin 1500 BID  -refused tdap booster, received at PMD on May 4 2018  -culture grew s. aureus -s/p I&D in ED  -will de-escalate vancomycin -> doxycycline   -refused tdap booster, received at PMD on May 4 2018  -culture grew s. aureus

## 2021-07-01 NOTE — DISCHARGE NOTE PROVIDER - NSDCCPCAREPLAN_GEN_ALL_CORE_FT
PRINCIPAL DISCHARGE DIAGNOSIS  Diagnosis: Abscess, hand  Assessment and Plan of Treatment: An abscess is a pocket of pus. You can get an abscess almost anywhere in your body. When an area of your body becomes infected, your body's immune system tries to fight the infection. White blood cells go to the infected area, collect within the damaged tissue, and cause inflammation. During this process, pus forms.   A shallow (superficial) abscess developed just under the skin on your left hand. This usually results from a minor injury, such as a splinter or needle.   The abscess was drained by plastic surgery. The type of bacteria that grew was Staphylococcus aureus or “staph”. Staph is a type of bacteria found on human skin, in the nose, armpit, groin, and other areas. You were treated with IV antibiotics while in the hospital. Vancomycin was the anitbiotic you were treated with.   Please take the antibiotic doxycycline for further treatment of your infection for a total of 8 days.         SECONDARY DISCHARGE DIAGNOSES  Diagnosis: Hypertension  Assessment and Plan of Treatment: You have high blood pressure. You take amlodipine 5mg at home. We increased your dose to 10mg daily. A prescription was sent to your pharmacy.   Please take amlodipine 10mg every day  Please follow-up with your primary care doctor after discharge for further management of your high blood pressure and medication refills.

## 2021-07-01 NOTE — PROGRESS NOTE ADULT - ASSESSMENT
44 y/o M with PMH of HTN and sciatica p/w abscess on 3rd finger of L hand and swelling on dorsum of L hand. WBC 11.73, X-ray of L hand showed cellulitis of 3rd finger, receiving 1500 mg vancomycin, s/p I&D in ED. stable 46 y/o M with PMH of HTN and sciatica p/w abscess on 3rd finger of L hand and swelling on dorsum of L hand. WBC 11.73, X-ray of L hand showed cellulitis of 3rd finger, receiving 1500 mg vancomycin, s/p I&D in ED. stable. Transitioning to doxycyline today for a total of 10 day course. Patient medically optimized for d'c today. Cultures growing Staph Aureus.

## 2021-07-01 NOTE — DISCHARGE NOTE NURSING/CASE MANAGEMENT/SOCIAL WORK - NSDCVIVACCINE_GEN_ALL_CORE_FT
Tdap; 29-Jun-2021 19:54; Sharda Sheridan (MARSHALL); [employer]; [id number] (Exp. Date: 25-Nov-2022); IntraMuscular; Deltoid Right.; 0.5 milliLiter(s); VIS (VIS Published: 09-May-2013, VIS Presented: 29-Jun-2021);

## 2021-07-04 LAB
CULTURE RESULTS: SIGNIFICANT CHANGE UP
ORGANISM # SPEC MICROSCOPIC CNT: SIGNIFICANT CHANGE UP
ORGANISM # SPEC MICROSCOPIC CNT: SIGNIFICANT CHANGE UP
SPECIMEN SOURCE: SIGNIFICANT CHANGE UP

## 2022-08-23 NOTE — DISCHARGE NOTE PROVIDER - HOSPITAL COURSE
Controlled on famotidine    46 y/o M w/ PMH of HTN and chronic back pain presenting with small pimple-like abscess on 3rd finger of L hand and swelling of dorsum portion of hand. Pt reports the abscess first formed on Saturday and progressively got more painful and the swelling spread until reaching approximately 3/4 of the dorsum of his hand today. He reports the pain as 10/10, constant, radiating from his finger to the rest of his hand. He did not try anything to alleviate the pain, movement makes the pain worse. Pt denies being bitten or scraping his hand, reports that the abscess formed out of nowhere. This has never happened before. Denies numbness, paresthesia, muscle weakness, fever, chills, SOB, CP, cough, nausea, vomiting, diarrhea, constipation.    Hospital course: underwent L hand I&D with plastic surgery. Treated with vancomycin. afebrile throughout hospital stay. no leukocytosis. Plastics recommended hand soak in hydrogen peroxide. Erythema and edema improved. ROM improved. Cleared by plastic surgery for discharge. Hand culture grew S. aureus. Discharged with 8 day supply of doxycycline to complete a 10 day course total of doxycyline. Patient received Tdap outpatient in 2018 therefore it was not given. Hypertensive therefore amlodipine increased to 10mg qd.    46 y/o M w/ PMH of HTN and chronic back pain presenting with small pimple-like abscess on 3rd finger of L hand and swelling of dorsum portion of hand. Pt reports the abscess first formed on Saturday and progressively got more painful and the swelling spread until reaching approximately 3/4 of the dorsum of his hand today. He reports the pain as 10/10, constant, radiating from his finger to the rest of his hand. He did not try anything to alleviate the pain, movement makes the pain worse. Pt denies being bitten or scraping his hand, reports that the abscess formed out of nowhere. This has never happened before. Denies numbness, paresthesia, muscle weakness, fever, chills, SOB, CP, cough, nausea, vomiting, diarrhea, constipation.    Hospital course: underwent L hand I&D with plastic surgery. Treated with vancomycin. afebrile throughout hospital stay. no leukocytosis. Plastics recommended hand soak in hydrogen peroxide. Erythema and edema improved. ROM improved. Cleared by plastic surgery for discharge. Hand culture grew pansensitive S. aureus. Discharged with 8 day supply of doxycycline to complete a 10 day course total of doxycyline. Patient received Tdap outpatient in 2018 therefore it was not given. Hypertensive therefore amlodipine increased to 10mg qd. Patient will follow-up with his PMD Dr. Saul after discharge

## 2024-09-17 NOTE — ED CDU PROVIDER INITIAL DAY NOTE - AGGRAVATING FACTORS
PRE-SEDATION ASSESSMENT    CONSENT  Consent for procedure and sedation obtained: Yes    MEDICAL HISTORY       PHYSICAL EXAM  History and Physical Reviewed: Patient has valid H&P within 30 days. I have reviewed and there are no changes.  Airway Anatomy : Class II  Heart : Normal  Lungs : Normal  LOC/Mental Status : Normal    OTHER FINDINGS  Reviewed current medications and allergies: Yes  Pertinent lab/diagnostic test reviewed: Yes    SEDATION RISK ASSESSMENT  Risk Status ASA: Class II - Normal patient with mild systemic disease  Plan for Sedation: Minimal Sedation  EKG Monitoring: Yes    NARRATIVE FINDINGS     
+Tried to squeeze a pimple
English